# Patient Record
Sex: FEMALE | Race: WHITE | NOT HISPANIC OR LATINO | URBAN - METROPOLITAN AREA
[De-identification: names, ages, dates, MRNs, and addresses within clinical notes are randomized per-mention and may not be internally consistent; named-entity substitution may affect disease eponyms.]

---

## 2023-06-23 ENCOUNTER — APPOINTMENT (OUTPATIENT)
Dept: PRIMARY CARE | Facility: CLINIC | Age: 28
End: 2023-06-23
Payer: COMMERCIAL

## 2023-06-27 ENCOUNTER — OFFICE VISIT (OUTPATIENT)
Dept: PRIMARY CARE | Facility: CLINIC | Age: 28
End: 2023-06-27
Payer: COMMERCIAL

## 2023-06-27 VITALS
SYSTOLIC BLOOD PRESSURE: 114 MMHG | TEMPERATURE: 97.5 F | DIASTOLIC BLOOD PRESSURE: 68 MMHG | OXYGEN SATURATION: 100 % | BODY MASS INDEX: 31.18 KG/M2 | HEIGHT: 63 IN | HEART RATE: 93 BPM | WEIGHT: 176 LBS | RESPIRATION RATE: 18 BRPM

## 2023-06-27 DIAGNOSIS — R53.83 FATIGUE, UNSPECIFIED TYPE: Primary | ICD-10-CM

## 2023-06-27 DIAGNOSIS — R21 RASH: ICD-10-CM

## 2023-06-27 DIAGNOSIS — R20.2 NUMBNESS AND TINGLING IN LEFT ARM: ICD-10-CM

## 2023-06-27 DIAGNOSIS — R20.0 NUMBNESS AND TINGLING IN LEFT ARM: ICD-10-CM

## 2023-06-27 PROCEDURE — 1036F TOBACCO NON-USER: CPT | Performed by: FAMILY MEDICINE

## 2023-06-27 PROCEDURE — 99213 OFFICE O/P EST LOW 20 MIN: CPT | Performed by: FAMILY MEDICINE

## 2023-06-27 RX ORDER — BUPROPION HYDROCHLORIDE 150 MG/1
150 TABLET ORAL DAILY
COMMUNITY
Start: 2023-06-02

## 2023-06-27 RX ORDER — DEXTROAMPHETAMINE SACCHARATE, AMPHETAMINE ASPARTATE, DEXTROAMPHETAMINE SULFATE AND AMPHETAMINE SULFATE 3.75; 3.75; 3.75; 3.75 MG/1; MG/1; MG/1; MG/1
1 TABLET ORAL 2 TIMES DAILY
COMMUNITY

## 2023-06-27 ASSESSMENT — ENCOUNTER SYMPTOMS: SHORTNESS OF BREATH: 0

## 2023-06-27 NOTE — PROGRESS NOTES
"Nael Umanzor is a 27 y.o. female who presents for Fatigue.    Lyme Disease  Pertinent negatives include no chest pain.        Pt is here to get evaluation for her fatigue, headache and recent rash on left arm - she was concerned with lyme disease because she had a bullseye looking rash that occurred on her left medial arm which she noticed seven days ago.  The rash fully resolved after 2-3 days after.  She was applying cortisone cream on the rash which helped.  She denies tick exposure but found a tick on her dog a few days after to her getting the rash on arm. Pt denies any recent travel to [facility].  She reports headaches daily for the past seven days.  She denies fevers.  No denies joint pains.  No muscle pains. She does feel somewhat fatigued. She did do a televisit for her rash through her insurance and was prescribed steroid cream which she didn't .  She sees psychiatry for adhd and depression - on adderall and wellbutrin.      She also reports numbness and tingling of left hand and forearm (medial aspect)  which started a few months ago.  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  She sometimes notes some weakness in her left hand.  She is right hand dominant.     She had labs done in jan 2023, low vit D, on supplement.      Review of Systems   Respiratory:  Negative for shortness of breath.    Cardiovascular:  Negative for chest pain.       Objective     Vitals:    06/27/23 1547   BP: 114/68   BP Location: Left arm   Patient Position: Sitting   Pulse: 93   Resp: 18   Temp: 36.4 °C (97.5 °F)   TempSrc: Temporal   SpO2: 100%   Weight: 79.8 kg (176 lb)   Height: 1.6 m (5' 3\")        Current Outpatient Medications   Medication Instructions    amphetamine-dextroamphetamine (Adderall) 15 mg tablet 1 tablet, oral, 2 times daily    buPROPion XL (WELLBUTRIN XL) 150 mg, oral, Daily    ETONOGESTREL SDRM subcutaneous        Past Surgical " History:   Procedure Laterality Date    OTHER SURGICAL HISTORY  08/20/2020    No history of surgery        Social History     Tobacco Use    Smoking status: Never    Smokeless tobacco: Never   Vaping Use    Vaping Use: Never used        No family history on file.     Immunization History   Administered Date(s) Administered    Pfizer Purple Cap SARS-CoV-2 03/10/2021, 03/31/2021        Physical Exam  Vitals reviewed.   Constitutional:       General: She is not in acute distress.     Appearance: Normal appearance. She is well-developed.   HENT:      Head: Normocephalic and atraumatic.      Nose: Nose normal.   Eyes:      General: Lids are normal.      Extraocular Movements: Extraocular movements intact.      Conjunctiva/sclera: Conjunctivae normal.      Right eye: Right conjunctiva is not injected.      Left eye: Left conjunctiva is not injected.   Cardiovascular:      Rate and Rhythm: Normal rate and regular rhythm.      Heart sounds: No murmur heard.  Pulmonary:      Effort: Pulmonary effort is normal. No respiratory distress.      Breath sounds: Normal breath sounds. No wheezing, rhonchi or rales.   Musculoskeletal:         General: Normal range of motion.      Right elbow: Normal.      Left elbow: Normal.      Right forearm: Normal.      Left forearm: Normal.      Right wrist: Normal.      Left wrist: Normal.      Comments: Tinels sign negative for left cubital tunnel and carpal tunnel regions.    Lymphadenopathy:      Cervical: No cervical adenopathy.   Skin:     General: Skin is warm and dry.      Findings: No rash.   Neurological:      Mental Status: She is alert and oriented to person, place, and time. Mental status is at baseline.   Psychiatric:         Mood and Affect: Mood normal.         Behavior: Behavior normal.         Problem List Items Addressed This Visit    None  Visit Diagnoses       Fatigue, unspecified type    -  Primary    Numbness and tingling in left arm        Rash                 Assessment/Plan     Fatigue, left arm rash - we discussed checking labs and discussed indication for lyme disease testing.  She reports the rash has fully resolved.      Left hand/forearm neuropathy - pt declined EMG - will monitor for now    Follow up 1 month for well exam or sooner if needed

## 2023-09-29 PROBLEM — E66.811 CLASS 1 OBESITY WITH BODY MASS INDEX (BMI) OF 31.0 TO 31.9 IN ADULT: Status: ACTIVE | Noted: 2023-09-29

## 2023-09-29 PROBLEM — H69.91 DYSFUNCTION OF RIGHT EUSTACHIAN TUBE: Status: ACTIVE | Noted: 2023-09-29

## 2023-09-29 PROBLEM — E55.9 VITAMIN D DEFICIENCY: Status: ACTIVE | Noted: 2023-09-29

## 2023-09-29 PROBLEM — E78.5 HYPERLIPIDEMIA, MILD: Status: ACTIVE | Noted: 2023-09-29

## 2023-09-29 PROBLEM — R53.83 FATIGUE: Status: ACTIVE | Noted: 2023-09-29

## 2023-09-29 PROBLEM — F33.0 MAJOR DEPRESSIVE DISORDER, RECURRENT EPISODE, MILD (CMS-HCC): Status: ACTIVE | Noted: 2022-12-03

## 2023-09-29 PROBLEM — F32.A DEPRESSION: Status: ACTIVE | Noted: 2023-09-29

## 2023-09-29 PROBLEM — H60.63 CHRONIC OTITIS EXTERNA OF BOTH EARS: Status: ACTIVE | Noted: 2023-09-29

## 2023-09-29 PROBLEM — E66.9 CLASS 1 OBESITY WITH BODY MASS INDEX (BMI) OF 31.0 TO 31.9 IN ADULT: Status: ACTIVE | Noted: 2023-09-29

## 2023-09-29 PROBLEM — R63.5 WEIGHT GAIN: Status: ACTIVE | Noted: 2023-09-29

## 2023-09-29 PROBLEM — N94.3 PMS (PREMENSTRUAL SYNDROME): Status: ACTIVE | Noted: 2023-09-29

## 2023-09-29 PROBLEM — F98.8 ATTENTION DEFICIT DISORDER (ADD) WITHOUT HYPERACTIVITY: Status: ACTIVE | Noted: 2021-01-22

## 2023-09-29 RX ORDER — FLUOCINOLONE ACETONIDE 0.11 MG/ML
4 OIL AURICULAR (OTIC)
COMMUNITY
Start: 2023-03-22

## 2023-09-29 RX ORDER — NEOMYCIN SULFATE, POLYMYXIN B SULFATE AND HYDROCORTISONE 10; 3.5; 1 MG/ML; MG/ML; [USP'U]/ML
4 SUSPENSION/ DROPS AURICULAR (OTIC) 3 TIMES DAILY
COMMUNITY
Start: 2023-03-14

## 2023-09-29 RX ORDER — TRIAMCINOLONE ACETONIDE 0.25 MG/G
CREAM TOPICAL
COMMUNITY
Start: 2023-06-23 | End: 2023-10-03

## 2023-10-02 ENCOUNTER — ROUTINE PRENATAL (OUTPATIENT)
Dept: OBSTETRICS AND GYNECOLOGY | Facility: CLINIC | Age: 28
End: 2023-10-02
Payer: COMMERCIAL

## 2023-10-02 DIAGNOSIS — Z97.5 NEXPLANON IN PLACE: ICD-10-CM

## 2023-10-02 DIAGNOSIS — Z01.419 WELL WOMAN EXAM WITH ROUTINE GYNECOLOGICAL EXAM: ICD-10-CM

## 2023-10-02 DIAGNOSIS — N89.8 VAGINAL IRRITATION: Primary | ICD-10-CM

## 2023-10-02 PROCEDURE — 99395 PREV VISIT EST AGE 18-39: CPT | Performed by: ADVANCED PRACTICE MIDWIFE

## 2023-10-03 DIAGNOSIS — N76.0 BACTERIAL VAGINOSIS: Primary | ICD-10-CM

## 2023-10-03 DIAGNOSIS — B96.89 BACTERIAL VAGINOSIS: Primary | ICD-10-CM

## 2023-10-03 LAB
CLUE CELLS VAG LPF-#/AREA: PRESENT /[LPF]
NUGENT SCORE: 7
YEAST VAG WET PREP-#/AREA: ABNORMAL

## 2023-10-03 RX ORDER — METRONIDAZOLE 500 MG/1
500 TABLET ORAL 2 TIMES DAILY
Qty: 14 TABLET | Refills: 0 | Status: SHIPPED | OUTPATIENT
Start: 2023-10-03 | End: 2023-10-10

## 2023-10-03 NOTE — PROGRESS NOTES
"28 y.o. yo  presents for well woman exam.   Concerns: irregular bleeding    GYN Hx:  LMP 23  Menses regular monthly, every 28 days lasting 5 days duration. Bleeding light, minimal cramping. After her last period she had spotting for 2 weeks. She also c/o slight vaginal discharge/odor, \"something doesn't feel right\" Denies itching or dysuria. Patient does report being under increased stress during time of spotting after menses  Contraception: Nexplanon since   STI Hx: denies  STI screening: declines  Pap Hx: 10/2021 NIL   Sexually Hx: sexually active with one male partner      Social Hx:   occupation: [employer]  Exercise: biking 2-3times per week  Alcohol/tobacco/drugs: yes - social use /no/no  Family history of breast, ovarian, or colon cancer:   denies  The patient's PMHx, PSHx, and FHx were reviewed and updated as indicated.     General:   alert and oriented, in no acute distress, appears stated age, and cooperative   Heart: regular rate and rhythm, S1, S2 normal, no murmur, click, rub or gallop   Lungs: clear to auscultation bilaterally   Abdomen: soft, non-tender, without masses or organomegaly   Vulva: normal   Vagina: Thin white discharge   Cervix: anteverted   Uterus: normal size   Adnexa: normal adnexa   Nexplanon palpated in left arm     A:  Well woman exam   Vaginal discharge  Nexplanon in place    P:  Pap up to date, due   Nexplanon in place, discussed changes to bleeding pattern and possibility of irregular bleeding with Nexplanon   Vaginitis cx sent   Monitor menses for 3-4 months  RTC for folllow up in 3-4 months or prn     Kayla JOAQUIN     "

## 2023-11-01 ENCOUNTER — APPOINTMENT (OUTPATIENT)
Dept: OBSTETRICS AND GYNECOLOGY | Facility: CLINIC | Age: 28
End: 2023-11-01
Payer: COMMERCIAL

## 2024-10-23 ENCOUNTER — APPOINTMENT (OUTPATIENT)
Dept: OBSTETRICS AND GYNECOLOGY | Facility: CLINIC | Age: 29
End: 2024-10-23
Payer: COMMERCIAL

## 2024-10-23 VITALS
WEIGHT: 168 LBS | BODY MASS INDEX: 29.77 KG/M2 | SYSTOLIC BLOOD PRESSURE: 120 MMHG | HEIGHT: 63 IN | DIASTOLIC BLOOD PRESSURE: 70 MMHG

## 2024-10-23 DIAGNOSIS — Z12.4 SCREENING FOR MALIGNANT NEOPLASM OF CERVIX: Primary | ICD-10-CM

## 2024-10-23 DIAGNOSIS — Z30.46 ENCOUNTER FOR SURVEILLANCE OF NEXPLANON SUBDERMAL CONTRACEPTIVE: ICD-10-CM

## 2024-10-23 PROCEDURE — 88141 CYTOPATH C/V INTERPRET: CPT | Performed by: PATHOLOGY

## 2024-10-23 PROCEDURE — 88175 CYTOPATH C/V AUTO FLUID REDO: CPT

## 2024-10-23 PROCEDURE — 99395 PREV VISIT EST AGE 18-39: CPT | Performed by: OBSTETRICS & GYNECOLOGY

## 2024-10-23 PROCEDURE — 1036F TOBACCO NON-USER: CPT | Performed by: OBSTETRICS & GYNECOLOGY

## 2024-10-23 PROCEDURE — 3008F BODY MASS INDEX DOCD: CPT | Performed by: OBSTETRICS & GYNECOLOGY

## 2024-10-23 PROCEDURE — 87624 HPV HI-RISK TYP POOLED RSLT: CPT

## 2024-10-23 ASSESSMENT — ENCOUNTER SYMPTOMS
CHILLS: 0
DIZZINESS: 0
PALPITATIONS: 0
VOMITING: 0
CONSTIPATION: 0
FREQUENCY: 0
SHORTNESS OF BREATH: 0
COUGH: 0
DYSURIA: 0
WEAKNESS: 0
ABDOMINAL PAIN: 0
HEADACHES: 0
HEMATURIA: 0
FEVER: 0
NAUSEA: 0
DIARRHEA: 0

## 2024-10-23 ASSESSMENT — PAIN SCALES - GENERAL: PAINLEVEL_OUTOF10: 0-NO PAIN

## 2024-10-23 NOTE — PROGRESS NOTES
Well Woman Visit    SUBJECTIVE  29 y.o.  female presents for well woman exam     OB/GYN History  OB History    Para Term  AB Living   0 0 0 0 0 0   SAB IAB Ectopic Multiple Live Births   0 0 0 0 0       Menstrual status: unknown  Patient's last menstrual period was 10/13/2024 (approximate).  Menses: regular  Abnormal bleeding: Yes - spotting 2-3 weeks between last period -10/3  Discharge: No  Pelvic pain: No  Pelvic prolapse: No  Urinary/fecal incontinence or overactive bladder: No  Breast concerns: No  Mood concerns: No    Social History     Substance and Sexual Activity   Sexual Activity Not on file     Sexually transmitted infections:no past history  History of abnormal pap: No  Last pap: approximate date 10/2021 and was normal  Sexual concerns: No  Desire to become pregnant in the next year: No    Other: None     The following portions of the chart were reviewed this encounter and updated as appropriate:    Tobacco  Allergies  Meds  Problems  Med Hx  Surg Hx  Fam Hx          Screenings  Social Drivers of Health     Tobacco Use: Low Risk  (10/23/2024)    Patient History     Smoking Tobacco Use: Never     Smokeless Tobacco Use: Never     Passive Exposure: Not on file   Alcohol Use: Not on file   Financial Resource Strain: Not on file   Food Insecurity: Not on file   Transportation Needs: Not on file   Physical Activity: Not on file   Stress: Not on file   Social Connections: Not on file   Intimate Partner Violence: Not on file   Depression: Not on file   Housing Stability: Not on file   Utilities: Not on file   Digital Equity: Not on file   Health Literacy: Not on file         Hereditary Cancer Risk Assessment:   Family History   Problem Relation Name Age of Onset    Ovarian cancer Neg Hx      Uterine cancer Neg Hx      Colon cancer Neg Hx      Breast cancer Neg Hx          Review of Systems  Review of Systems   Constitutional:  Negative for chills and fever.   Eyes:  Negative for  "visual disturbance.   Respiratory:  Negative for cough and shortness of breath.    Cardiovascular:  Negative for chest pain and palpitations.   Gastrointestinal:  Negative for abdominal pain, constipation, diarrhea, nausea and vomiting.   Genitourinary:  Positive for vaginal bleeding. Negative for dyspareunia, dysuria, frequency, hematuria, urgency and vaginal discharge.   Neurological:  Negative for dizziness, weakness and headaches.            OBJECTIVE  Vitals:    10/23/24 1259   BP: 120/70   Weight: 76.2 kg (168 lb)   Height: 1.6 m (5' 3\")     Body mass index is 29.76 kg/m².      Physical Exam  Constitutional:       General: She is not in acute distress.     Appearance: Normal appearance.   Genitourinary:      Vulva and rectum normal.      Right Labia: No skin changes.     Left Labia: No skin changes.     No vaginal discharge.      No cervical discharge or lesion.   Breasts:     Breasts are symmetrical.      Right: Normal.      Left: Normal.   HENT:      Head: Normocephalic and atraumatic.      Nose: Nose normal.      Mouth/Throat:      Mouth: Mucous membranes are moist.      Pharynx: Oropharynx is clear.   Eyes:      Extraocular Movements: Extraocular movements intact.      Conjunctiva/sclera: Conjunctivae normal.      Pupils: Pupils are equal, round, and reactive to light.   Cardiovascular:      Rate and Rhythm: Normal rate.      Pulses: Normal pulses.   Pulmonary:      Effort: Pulmonary effort is normal.   Abdominal:      General: Abdomen is flat. There is no distension.      Palpations: Abdomen is soft.      Tenderness: There is no abdominal tenderness. There is no guarding or rebound.   Musculoskeletal:         General: Normal range of motion.   Neurological:      General: No focal deficit present.      Mental Status: She is alert and oriented to person, place, and time.   Skin:     General: Skin is warm and dry.   Psychiatric:         Mood and Affect: Mood normal.         Behavior: Behavior normal. "   Vitals reviewed. Exam conducted with a chaperone present.          Immunization History   Administered Date(s) Administered    Influenza, seasonal, injectable 12/29/2022    Pfizer Gray Cap SARS-CoV-2 01/11/2022    Pfizer Purple Cap SARS-CoV-2 03/10/2021, 03/31/2021    Tdap vaccine, age 7 year and older (BOOSTRIX, ADACEL) 04/19/2018, 08/24/2020         ASSESSMENT AND PLAN  -Well woman screenings performed today  -Reviewed cervical cancer screening recommendations. Gardasil unsure - will ask mother  -Discussed reproductive life plan - nexplanon at 3 years  -Encouraged routine wellness exams with PCP Azam Ruiz DO     -Issues identified and addressed today:   Problem List Items Addressed This Visit          Ob-Gyn Problems    Encounter for surveillance of Nexplanon subdermal contraceptive    Overview     - Reviewed extended usage of nexplanon for up to five years, FDA approved for 3 years  - Reviewed that reasons for earlier removal and replacement may be abnormal bleeding or spotting  - Discussed can make a follow up at any time if she would like it replaced          Other Visit Diagnoses       Screening for malignant neoplasm of cervix    -  Primary    Relevant Orders    THINPREP PAP               Follow up 1 year, sooner if needed    Silvia Tovar MD  Obstetrics & Gynecology  10/23/24

## 2024-11-06 ENCOUNTER — TELEPHONE (OUTPATIENT)
Dept: OBSTETRICS AND GYNECOLOGY | Facility: CLINIC | Age: 29
End: 2024-11-06
Payer: COMMERCIAL

## 2024-11-06 LAB
CYTOLOGY CMNT CVX/VAG CYTO-IMP: NORMAL
HPV HR 12 DNA GENITAL QL NAA+PROBE: POSITIVE
HPV HR GENOTYPES PNL CVX NAA+PROBE: POSITIVE
HPV16 DNA SPEC QL NAA+PROBE: NEGATIVE
HPV18 DNA SPEC QL NAA+PROBE: NEGATIVE
LAB AP HPV GENOTYPE QUESTION: YES
LAB AP HPV HR: NORMAL
LABORATORY COMMENT REPORT: NORMAL
LMP START DATE: NORMAL
PATH REPORT.TOTAL CANCER: NORMAL
RESIDENT REVIEW: NORMAL

## 2024-11-06 NOTE — TELEPHONE ENCOUNTER
Discuss results with patient. Patient is scheduled for colpo. All patient questions and concerns were answered.

## 2024-11-26 ENCOUNTER — APPOINTMENT (OUTPATIENT)
Dept: OBSTETRICS AND GYNECOLOGY | Facility: CLINIC | Age: 29
End: 2024-11-26
Payer: COMMERCIAL

## 2024-11-26 VITALS
DIASTOLIC BLOOD PRESSURE: 78 MMHG | BODY MASS INDEX: 29.77 KG/M2 | SYSTOLIC BLOOD PRESSURE: 118 MMHG | HEIGHT: 63 IN | WEIGHT: 168 LBS

## 2024-11-26 DIAGNOSIS — R87.610 ATYPICAL SQUAMOUS CELLS OF UNDETERMINED SIGNIFICANCE ON CYTOLOGIC SMEAR OF CERVIX (ASC-US): Primary | ICD-10-CM

## 2024-11-26 PROCEDURE — 57454 BX/CURETT OF CERVIX W/SCOPE: CPT | Performed by: OBSTETRICS & GYNECOLOGY

## 2024-11-26 PROCEDURE — 88305 TISSUE EXAM BY PATHOLOGIST: CPT | Performed by: STUDENT IN AN ORGANIZED HEALTH CARE EDUCATION/TRAINING PROGRAM

## 2024-11-26 PROCEDURE — 88305 TISSUE EXAM BY PATHOLOGIST: CPT

## 2024-11-26 ASSESSMENT — PAIN SCALES - GENERAL: PAINLEVEL_OUTOF10: 0-NO PAIN

## 2024-11-26 NOTE — PROGRESS NOTES
Patient ID: Cyndy Umanzor is a 29 y.o. female.    Colposcopy    Date/Time: 11/26/2024 9:59 AM    Performed by: Silvia Tovar MD  Authorized by: Silvia Tovar MD    Procedure location: cervix    Consent:     Patient questions answered: yes      Risks and benefits of the procedure and its alternatives discussed: yes      Procedural risks discussed:  Bleeding, infection and repeat procedure    Consent obtained:  Written    Consent given by:  Patient  Indication:     Cervical indication(s): high-risk HPV positive and ASCUS    Pre-procedure:     Prep solution(s): acetic acid    Procedure:     Colposcopy with: cervical biopsy and endocervical curettage      Biopsy taken: yes      # of biopsies:  1    Biopsy location(s): 12 o'clock    Colposcopy details:  Small aceto-white changes at 12 o'clock    Cervix visibility: fully visualized      SCJ visibility: fully visualized      Acetowhite lesion(s): cervix      Acetowhite lesion(s) description:  12 o'clock    Cervical impression: normal/benign    Post-procedure:     Patient tolerance of procedure:  Patient tolerated the procedure well with no immediate complications    Estimated blood loss (mL):  1    Educational handouts given: yes    Silvia Tovar MD

## 2024-12-12 LAB
LAB AP ASR DISCLAIMER: NORMAL
LABORATORY COMMENT REPORT: NORMAL
PATH REPORT.COMMENTS IMP SPEC: NORMAL
PATH REPORT.FINAL DX SPEC: NORMAL
PATH REPORT.GROSS SPEC: NORMAL
PATH REPORT.RELEVANT HX SPEC: NORMAL
PATH REPORT.TOTAL CANCER: NORMAL

## 2024-12-12 PROCEDURE — 88342 IMHCHEM/IMCYTCHM 1ST ANTB: CPT | Performed by: STUDENT IN AN ORGANIZED HEALTH CARE EDUCATION/TRAINING PROGRAM

## 2024-12-12 PROCEDURE — 88342 IMHCHEM/IMCYTCHM 1ST ANTB: CPT

## 2025-08-06 ENCOUNTER — PROCEDURE VISIT (OUTPATIENT)
Dept: OBSTETRICS AND GYNECOLOGY | Facility: CLINIC | Age: 30
End: 2025-08-06
Payer: COMMERCIAL

## 2025-08-06 VITALS — WEIGHT: 161.4 LBS | DIASTOLIC BLOOD PRESSURE: 80 MMHG | BODY MASS INDEX: 28.59 KG/M2 | SYSTOLIC BLOOD PRESSURE: 110 MMHG

## 2025-08-06 DIAGNOSIS — Z30.46 ENCOUNTER FOR REMOVAL AND REINSERTION OF NEXPLANON: Primary | ICD-10-CM

## 2025-08-06 ASSESSMENT — PATIENT HEALTH QUESTIONNAIRE - PHQ9
2. FEELING DOWN, DEPRESSED OR HOPELESS: NOT AT ALL
SUM OF ALL RESPONSES TO PHQ9 QUESTIONS 1 AND 2: 0
1. LITTLE INTEREST OR PLEASURE IN DOING THINGS: NOT AT ALL

## 2025-08-06 ASSESSMENT — PAIN SCALES - GENERAL: PAINLEVEL_OUTOF10: 0-NO PAIN

## 2025-08-06 NOTE — PROGRESS NOTES
Patient ID: Cyndy Umanzor is a 30 y.o. female.    Insertion of Contraceptive Capsule    Date/Time: 8/6/2025 11:30 AM    Performed by: Silvia Tovar MD  Authorized by: Silvia Tovar MD    Consent:     Consent obtained:  Written    Consent given by:  Patient    Procedural risks discussed:  Bleeding, infection and repeat procedure    Patient questions answered: yes      Patient agrees, verbalizes understanding, and wants to proceed: yes    Indication:     Indication: Presence of non-biodegradable drug delivery implant    Pre-procedure:     Prepped with: alcohol 70% and povidone-iodine      Local anesthetic:  Lidocaine 1%    The site was cleaned and prepped in a sterile fashion: yes    Procedure:     Procedure:  Removal with reinsertion    Small stab incision was made in arm: yes      Left/right:  Left    Preloaded contraceptive capsule trocar was placed subdermally: yes      Visualization of implant was obtained: yes      Contraceptive capsule was inserted and trocar removed: yes      Visualization of notch in stylet and palpation of device: yes      Palpation confirms placement by provider and patient: yes      Site was closed with steri-strips and pressure bandage applied: yes    Comments:      Uncomplicated removal and replacement    Silvia Tovar MD     no

## 2025-10-29 ENCOUNTER — APPOINTMENT (OUTPATIENT)
Dept: OBSTETRICS AND GYNECOLOGY | Facility: CLINIC | Age: 30
End: 2025-10-29
Payer: COMMERCIAL